# Patient Record
Sex: MALE
[De-identification: names, ages, dates, MRNs, and addresses within clinical notes are randomized per-mention and may not be internally consistent; named-entity substitution may affect disease eponyms.]

---

## 2021-06-30 ENCOUNTER — NURSE TRIAGE (OUTPATIENT)
Dept: OTHER | Facility: CLINIC | Age: 46
End: 2021-06-30

## 2021-07-01 NOTE — TELEPHONE ENCOUNTER
Reason for Disposition   [1] Lightheadedness or dizziness AND [2] persists > 10 minutes AND [3] not relieved by reassurance provided by triager   Patient sounds very sick or weak to the triager    Answer Assessment - Initial Assessment Questions  1. DESCRIPTION: \"Describe your dizziness. \"      Feeling lightheaded- flushed - headache coming on- shaky    2. LIGHTHEADED: \"Do you feel lightheaded? \" (e.g., somewhat faint, woozy, weak upon standing)      Weak upon standing-\"my legs feel weak\"     3. VERTIGO: \"Do you feel like either you or the room is spinning or tilting? \" (i.e. vertigo)      Denies     4. SEVERITY: \"How bad is it? \"  \"Do you feel like you are going to faint? \" \"Can you stand and walk? \"    - MILD - walking normally    - MODERATE - interferes with normal activities (e.g., work, school)     - SEVERE - unable to stand, requires support to walk, feels like passing out now. Mild     5. ONSET:  \"When did the dizziness begin? \"      Started 45 minutes prior to triage     6. AGGRAVATING FACTORS: \"Does anything make it worse? \" (e.g., standing, change in head position)      Denies     7. HEART RATE: \"Can you tell me your heart rate? \" \"How many beats in 15 seconds? \"  (Note: not all patients can do this)        Unsure     8. CAUSE: \"What do you think is causing the dizziness? \"      Pt is unsure     9. RECURRENT SYMPTOM: \"Have you had dizziness before? \" If so, ask: \"When was the last time? \" \"What happened that time? \"      Yes - vertigo spells     10. OTHER SYMPTOMS: \"Do you have any other symptoms? \" (e.g., fever, chest pain, vomiting, diarrhea, bleeding)        Denies     11. PREGNANCY: \"Is there any chance you are pregnant? \" \"When was your last menstrual period? \"        N/A    Answer Assessment - Initial Assessment Questions  1. CONCERN: \"What happened that made you call today? \"      Pt came home from grocery store and sat down on a stool  while son was taking a bath - had a \"spell\" - described the spell as a sensation that started in the abdomen and moved its way up to the head- \"felt like a head rush\" - concerned he is having a panic attack due to the sensation     2. ANXIETY SYMPTOM SCREENING: \"Can you describe how you have been feeling? \"  (e.g., tense, restless, panicky, anxious, keyed up, trouble sleeping, trouble concentrating)      Very busy lately - recently moved - several days of nonstop work - \"under stress lately\"     3. ONSET: \"How long have you been feeling this way? \"      Started tonight about 45 minutes ago     4. RECURRENT: \"Have you felt this way before? \"  If yes: \"What happened that time? \" \"What helped these feelings go away in the past?\"       Denies     5. RISK OF HARM - SUICIDAL IDEATION:  \"Do you ever have thoughts of hurting or killing yourself? \"  (e.g., yes, no, no but preoccupation with thoughts about death)    - INTENT:  \"Do you have thoughts of hurting or killing yourself right NOW? \" (e.g., yes, no, N/A)    - PLAN: \"Do you have a specific plan for how you would do this? \" (e.g., gun, knife, overdose, no plan, N/A)      Denies     6. RISK OF HARM - HOMICIDAL IDEATION:  \"Do you ever have thoughts of hurting or killing someone else? \"  (e.g., yes, no, no but preoccupation with thoughts about death)    - INTENT:  \"Do you have thoughts of hurting or killing someone right NOW? \" (e.g., yes, no, N/A)    - PLAN: \"Do you have a specific plan for how you would do this? \" (e.g., gun, knife, no plan, N/A)       Denies     7. FUNCTIONAL IMPAIRMENT: \"How have things been going for you overall in your life? Have you had any more difficulties than usual doing your normal daily activities? \"  (e.g., better, same, worse; self-care, school, work, interactions)      Short tempered lately     6. SUPPORT: \"Who is with you now? \" \"Who do you live with?\" \"Do you have family or friends nearby who you can talk to?\"       Lives with wife and son     5. THERAPIST: \"Do you have a counselor or therapist? Name? \"      Denies 10. STRESSORS: \"Has there been any new stress or recent changes in your life? \"        Recently moved     11. CAFFEINE ABUSE: \"Do you drink caffeinated beverages, and how much each day? \" (e.g., coffee, tea, rosemary)        2 cups of coffee     12. SUBSTANCE ABUSE: \"Do you use any illegal drugs or alcohol? \"        None - some alcohol last night     13. OTHER SYMPTOMS: \"Do you have any other physical symptoms right now? \" (e.g., chest pain, palpitations, difficulty breathing, fever)       Feel lightheaded- feel flushed     14. PREGNANCY: \"Is there any chance you are pregnant? \" \"When was your last menstrual period? \"        N/A    Protocols used: ANXIETY AND PANIC ATTACK-ADULT-AH, DIZZINESS - LIGHTHEADEDNESS-ADULT-AH    Brief description of triage: pt calling to report dizziness, shakiness, feeling lightheaded after sitting down on a stool while his son was bathing. He states he started feeling flush and had \" a weird sensation that started in the abdomen and went up the body to my head\" - pt feels he may be having a panic attack after the experience, but is uncertain. Pt is feeling weak, and weak upon standing. He reports taking a beta blocker for PVC's- Denies chest pain/discomfort or heart palpitations. Triage indicates for patient to Go to ED now. Pt in agreement with this plan. Care advice provided, patient verbalizes understanding; denies any other questions or concerns; instructed to call back for any new or worsening symptoms. This triage is a result of a call to [address] [place] [address]. Please do not respond to the triage nurse through this encounter. Any subsequent communication should be directly with the patient.